# Patient Record
Sex: MALE | Race: ASIAN
[De-identification: names, ages, dates, MRNs, and addresses within clinical notes are randomized per-mention and may not be internally consistent; named-entity substitution may affect disease eponyms.]

---

## 2020-01-13 PROBLEM — Z00.00 ENCOUNTER FOR PREVENTIVE HEALTH EXAMINATION: Status: ACTIVE | Noted: 2020-01-13

## 2020-01-16 ENCOUNTER — APPOINTMENT (OUTPATIENT)
Dept: COLORECTAL SURGERY | Facility: CLINIC | Age: 60
End: 2020-01-16
Payer: MEDICAID

## 2020-01-16 VITALS
BODY MASS INDEX: 29.51 KG/M2 | WEIGHT: 188 LBS | DIASTOLIC BLOOD PRESSURE: 73 MMHG | TEMPERATURE: 97.8 F | HEART RATE: 102 BPM | SYSTOLIC BLOOD PRESSURE: 122 MMHG | HEIGHT: 67 IN

## 2020-01-16 DIAGNOSIS — I10 ESSENTIAL (PRIMARY) HYPERTENSION: ICD-10-CM

## 2020-01-16 DIAGNOSIS — C18.9 MALIGNANT NEOPLASM OF COLON, UNSPECIFIED: ICD-10-CM

## 2020-01-16 DIAGNOSIS — Z72.3 LACK OF PHYSICAL EXERCISE: ICD-10-CM

## 2020-01-16 DIAGNOSIS — M19.90 UNSPECIFIED OSTEOARTHRITIS, UNSPECIFIED SITE: ICD-10-CM

## 2020-01-16 PROCEDURE — 99204 OFFICE O/P NEW MOD 45 MIN: CPT

## 2020-01-16 RX ORDER — GLUC/MSM/COLGN2/HYAL/ANTIARTH3 375-375-20
TABLET ORAL
Refills: 0 | Status: ACTIVE | COMMUNITY

## 2020-01-16 RX ORDER — OMEPRAZOLE, SODIUM BICARBONATE 40; 1100 MG/1; MG/1
CAPSULE ORAL
Refills: 0 | Status: ACTIVE | COMMUNITY

## 2020-01-16 RX ORDER — LISINOPRIL 30 MG/1
TABLET ORAL
Refills: 0 | Status: ACTIVE | COMMUNITY

## 2020-01-16 NOTE — PHYSICAL EXAM
[FreeTextEntry1] :  Medical assistant present for duration of physical examination\par \par Gen NAD, AOx4\par Well nourished\par Comfortable in chair\par Nonlabored breathing\par Ambulating without assistance\par Abdomen soft nondistended nontender

## 2020-01-16 NOTE — HISTORY OF PRESENT ILLNESS
[FreeTextEntry1] : 60 yo Italian speaking M presents w/ niece for evaluation of colon cancer with liver metastases\par hx of positive FOBT and low hgb (8.5), started on iron once daily 3 months ago improved to 9.2 \par hx of presenting to [facility] [place] w/ c/o dizziness and loss of balance, completed EKG and chest xray, diagnosed w/ influenza\par \par Pt underwent screening colonoscopy with Dr. Nation on 1/10/20:\par (+) fungating and ulcerated partially obstructing large mass at the hepatic flexure. The mass was circumferential w/ stenosis, however the CF scope was able to traverse w/ no difficulties. Oozing present. Area was biopsied and tattooed distally. Pathology: Fragments of moderately differentiated colonic adenocarcinoma, ulcerated.\par (+) medium sized pedunculated polyp in recto-sigmoid colon, resected and retrieved, pathology: TA\par (+) 9-10 mm sessile polyp in the transverse colon, resected and retrieved:  Colonic adenocarcinoma, at least intramucosal in this material, in a background of TA w/ focal HGD\par (+) 4 sessile polyps found in the rectum, sigmoid and transverse colon, ranging from 3-6 mm in size, resected and retrieved\par \par CT a/p completed 1/13/20:\par Colonic mass at the hepatic flexure, consistent with neoplasm\par Number confluent hepatic metastases, metastatic peritoneal implants and nodes and bilateral lower lobw pulmonary nodules concerning for metastases.\par CT Chest not completed\par \par Pt reports occasional nausea and LLQ abdominal pain improved w/ BM and passing flatus\par Reports he lost 6-7 lbs over the last 3 months and low appetite however makes himself eat smaller amounts\par Denies fever or vomiting. Admits some bleeding after colonoscopy which has since resolved\par Denies constipation or diarrhea, denies CP or SOB\par \par BH: 2 times daily, occasionally strains. Able to fully evacuate\par Denies [facility] colorectal CA\par No ASA use

## 2020-01-16 NOTE — ASSESSMENT
[FreeTextEntry1] : Long discussion with the patient and his nieces regarding diagnosis and management recommendations\par They are aware and understand findings of colon cancer, with Colonic mass at the hepatic flexure, multiple bilobar hepatic metastases, and metastatic peritoneal implants and nodes and bilateral lower lobe pulmonary nodules concerning for metastases.\par They understand that surgical control of disease would be unlikely and that treatment recommendation would be for systemic chemotherapy\par They understand the prognosis of his disease\par He is scheduled to see an oncologist at [facility] 1/23 that was recommended by PCP\par Discussed treatment plan with referring provider Dr Sarbjit Nation\par All questions were answered, patient expressed understanding, and is agreeable to this plan.\par